# Patient Record
Sex: MALE
[De-identification: names, ages, dates, MRNs, and addresses within clinical notes are randomized per-mention and may not be internally consistent; named-entity substitution may affect disease eponyms.]

---

## 2021-04-06 ENCOUNTER — APPOINTMENT (OUTPATIENT)
Dept: DISASTER EMERGENCY | Facility: OTHER | Age: 36
End: 2021-04-06
Payer: COMMERCIAL

## 2021-04-06 PROCEDURE — 0001A: CPT

## 2021-04-27 ENCOUNTER — APPOINTMENT (OUTPATIENT)
Dept: DISASTER EMERGENCY | Facility: OTHER | Age: 36
End: 2021-04-27
Payer: COMMERCIAL

## 2021-04-27 PROCEDURE — 0002A: CPT

## 2022-08-01 ENCOUNTER — APPOINTMENT (OUTPATIENT)
Dept: PULMONOLOGY | Facility: CLINIC | Age: 37
End: 2022-08-01

## 2022-08-01 VITALS
TEMPERATURE: 95.6 F | DIASTOLIC BLOOD PRESSURE: 80 MMHG | HEIGHT: 67 IN | OXYGEN SATURATION: 98 % | BODY MASS INDEX: 26.68 KG/M2 | HEART RATE: 82 BPM | RESPIRATION RATE: 16 BRPM | WEIGHT: 170 LBS | SYSTOLIC BLOOD PRESSURE: 130 MMHG

## 2022-08-01 DIAGNOSIS — Z78.9 OTHER SPECIFIED HEALTH STATUS: ICD-10-CM

## 2022-08-01 DIAGNOSIS — Z82.49 FAMILY HISTORY OF ISCHEMIC HEART DISEASE AND OTHER DISEASES OF THE CIRCULATORY SYSTEM: ICD-10-CM

## 2022-08-01 DIAGNOSIS — Z82.5 FAMILY HISTORY OF ASTHMA AND OTHER CHRONIC LOWER RESPIRATORY DISEASES: ICD-10-CM

## 2022-08-01 DIAGNOSIS — Z86.69 PERSONAL HISTORY OF OTHER DISEASES OF THE NERVOUS SYSTEM AND SENSE ORGANS: ICD-10-CM

## 2022-08-01 PROBLEM — Z00.00 ENCOUNTER FOR PREVENTIVE HEALTH EXAMINATION: Status: ACTIVE | Noted: 2022-08-01

## 2022-08-01 PROCEDURE — 95012 NITRIC OXIDE EXP GAS DETER: CPT

## 2022-08-01 PROCEDURE — 94727 GAS DIL/WSHOT DETER LNG VOL: CPT

## 2022-08-01 PROCEDURE — 94618 PULMONARY STRESS TESTING: CPT

## 2022-08-01 PROCEDURE — 99204 OFFICE O/P NEW MOD 45 MIN: CPT | Mod: 25

## 2022-08-01 PROCEDURE — 94010 BREATHING CAPACITY TEST: CPT

## 2022-08-01 PROCEDURE — 71046 X-RAY EXAM CHEST 2 VIEWS: CPT

## 2022-08-01 PROCEDURE — 94729 DIFFUSING CAPACITY: CPT

## 2022-08-01 RX ORDER — MONTELUKAST 10 MG/1
10 TABLET, FILM COATED ORAL
Qty: 30 | Refills: 0 | Status: ACTIVE | COMMUNITY
Start: 2021-12-01

## 2022-08-01 RX ORDER — CETIRIZINE HYDROCHLORIDE 10 MG/1
10 TABLET, COATED ORAL
Qty: 30 | Refills: 0 | Status: ACTIVE | COMMUNITY
Start: 2021-12-01

## 2022-08-01 RX ORDER — DESLORATADINE 5 MG/1
5 TABLET ORAL DAILY
Qty: 90 | Refills: 1 | Status: ACTIVE | COMMUNITY
Start: 2022-08-01 | End: 1900-01-01

## 2022-08-01 RX ORDER — ALBUTEROL SULFATE 2.5 MG/3ML
(2.5 MG/3ML) SOLUTION RESPIRATORY (INHALATION)
Qty: 120 | Refills: 5 | Status: ACTIVE | COMMUNITY
Start: 2022-08-01 | End: 1900-01-01

## 2022-08-01 RX ORDER — OLOPATADINE HYDROCHLORIDE 665 UG/1
0.6 SPRAY, METERED NASAL
Qty: 3 | Refills: 1 | Status: ACTIVE | COMMUNITY
Start: 2022-08-01 | End: 1900-01-01

## 2022-08-01 NOTE — ADDENDUM
[FreeTextEntry1] : Documented by Irma Layton acting as a scribe for Dr. Ector Caro on 08/01/2022 \par \par All medical record entries made by the Scribe were at my, Dr. Ector Caro's, direction and personally dictated by me on 08/01/2022 . I have reviewed the chart and agree that the record accurately reflects my personal performance of the history, physical exam, assessment and plan. I have also personally directed, reviewed, and agree with the discharge instructions

## 2022-08-01 NOTE — HISTORY OF PRESENT ILLNESS
[TextBox_4] : Mr. RASMEY is a 36 year old male originally from [place] with a history of migraines, HTN, asthma, allergies/sinus, COVID-19 4/2022 ?CLAUDIA presenting to the office today for initial pulmonary evaluation. His chief complaint is\par \par \par -he notes asthma onset 6-7 years\par -he notes asthma symptoms are chest tightness, chest pressure, wheezing and SOB\par -he notes cats, humidity, illness and cold air exacerbates asthma \par -he notes allergies active spring and fall but mostly during the spring\par -he notes sinus symptoms are nasal congestion and PND\par -he notes not feeling rested after waking up \par -he notes ability to sleep while watching TV\par -he notes memory and concentration are good\par -he notes intermittent nocturia\par -she notes sense of smell and taste is stable \par -He notes bowels are regular \par -he notes no vision issues\par -he notes intermittent migraines\par -he denies use of nebulizer\par \par -he denies any visual issues, nausea, vomiting, fever, chills, sweats, diarrhea, constipation, dysphagia, dizziness, leg swelling, leg pain, itchy eyes, itchy ears, heartburn, reflux, sour taste in the mouth, myalgias or arthralgias.

## 2022-08-01 NOTE — ASSESSMENT
[FreeTextEntry1] : Mr. RAMSEY is a 36 year old male originally from [place] with a history of migraines, HTN, asthma, allergies/sinus, COVID-19 4/2022 ?CLAUDIA presenting to the office today for initial pulmonary evaluation for SOB, moderate persistent asthma, allergies/sinus, ?CLAUDIA\par \par His shortness of breath is multifactorial due to:\par -poor mechanics of breathing \par -out of shape \par - pulmonary disease\par     moderate persistent asthma \par -cardiac disease\par    -doubtful \par \par problem 1: moderate persistent asthma\par - Add Trelegy (200) 1 puff QD \par - Add Singulair 10 mg QHS \par -add Albuterol (0.83) mdi or via nebulizer, Q6H \par -complete alpha 1 anti-trypsin level\par  - Asthma is believed to be caused by inherited (genetic) and environmental factor, but its exact cause is unknown. Asthma may be triggered by allergens, lung infections, or irritants in the air. Asthma triggers are different for each person. \par -Inhaler technique reviewed as well as oral hygiene techniques reviewed with patient. Avoidance of cold air, extremes of temperature, rescue inhaler should be used before exercise. Order of medication reviewed with patient. Recommended use of a cool mist humidifier in the bedroom. \par \par problem 2: allergies/sinus\par -add Clarinex 5 mg QAM \par -add Olopatadine 0.6% 1 sniff BID \par - script given for blood work: asthma profile, food IgE panel, eosinophil level, IgE level, Vitamin D level,  \par - Environmental measures for allergies were encouraged including mattress and pillow cover, air purifier, and environmental controls. \par \par Problem 3:?CLAUDIA\par -complete Home Sleep study \par Sleep apnea is associated with adverse clinical consequences which can affect most organ systems.  Cardiovascular disease risk includes arrhythmias, atrial fibrillation, hypertension, coronary artery disease, and stroke. Metabolic disorders include diabetes type 2, non-alcoholic fatty liver disease. Mood disorder especially depression; and cognitive decline especially in the elderly. Associations with  chronic reflux/Davidson’s esophagus some but not all inclusive. \par -Reasons  include arousal consistent with hypopnea; respiratory events most prominent in REM sleep or supine position; therefore sleep staging and body position are important for accurate diagnosis and estimation of AHI. \par \par problem 4: cardiac disease\par -recommended to continue to follow up with Cardiologist if needed \par \par problem 5: poor breathing mechanics\par -Proper breathing techniques were reviewed with an emphasis of exhalation. Patient instructed to breath in for 1 second and out for four seconds. Patient was encouraged to not talk while walking. \par \par problem 6:  out of shape \par -Weight loss, exercise, and diet control were discussed and are highly encouraged. Treatment options are given such as, aqua therapy, and contacting a nutritionist. Recommended to use the elliptical, stationary bike, less use of treadmill. \par \par problem 7: health maintenance \par -recommended yearly flu shot after October 15\par -recommended strep pneumonia vaccines: Prevnar-13 vaccine, followed by Pneumo vaccine 23 one year following after 65 years old. \par -recommended early intervention for Upper Respiratory Infections (URIs)\par -recommended regular osteoporosis evaluations\par -recommended early dermatological evaluations\par -recommended after the age of 50 to the age of 70, colonoscopy every 5 years\par \par F/U in 6-8 weeks.\par He is encouraged to call with any changes, concerns, or questions\par

## 2022-08-01 NOTE — REASON FOR VISIT
[Initial] : an initial visit [TextBox_44] : SOB, moderate persistent asthma, allergies/ sinus, ?CLAUDIA

## 2022-08-01 NOTE — PROCEDURE
[FreeTextEntry1] : CXR reveals normal sized heart; there was no evidence of infiltrate or effusion -- A normal chest radiograph. \par \par FENO was 54; normal value being less than 25\par Fractional exhaled nitric oxide (FENO) is regarded as a simple, noninvasive method for assessing eosinophilic airway inflammation. Produced by a variety of cells within the lung, nitric oxide (NO) concentrations are generally low in healthy individuals. However, high concentrations of NO appear to be involved in nonspecific host defense mechanisms and chronic inflammatory diseases such as asthma. The American Thoracic Society (ATS) therefore has recommended using FENO to aid in the diagnosis and monitoring of eosinophilic airway inflammation and asthma, and for identifying steroid responsive individuals whose chronic respiratory symptoms may be airway inflammation. \par \par FULL PFTs reveals mild restrictive and severe obstructive dysfunction; FEV1 was  1.91L which is 51% of predicted; below normal lung volumes; normal diffusion of 23.1, which is 85% of predicted; normal flow volume loop \par \par 6 minute walk test reveals a low saturation of 88% with slight dyspnea or fatigue; walked 391.2 meters

## 2022-08-01 NOTE — PHYSICAL EXAM
[No Acute Distress] : no acute distress [Normal Oropharynx] : normal oropharynx [Normal Appearance] : normal appearance [No Neck Mass] : no neck mass [Normal Rate/Rhythm] : normal rate/rhythm [Normal S1, S2] : normal s1, s2 [No Murmurs] : no murmurs [No Resp Distress] : no resp distress [Clear to Auscultation Bilaterally] : clear to auscultation bilaterally [No Abnormalities] : no abnormalities [Benign] : benign [Normal Gait] : normal gait [No Clubbing] : no clubbing [No Cyanosis] : no cyanosis [No Edema] : no edema [FROM] : FROM [Normal Color/ Pigmentation] : normal color/ pigmentation [No Focal Deficits] : no focal deficits [Oriented x3] : oriented x3 [Normal Affect] : normal affect [III] : Mallampati Class: III [TextBox_68] : I:E 1:3, expiratory wheezes b/l

## 2022-08-03 ENCOUNTER — LABORATORY RESULT (OUTPATIENT)
Age: 37
End: 2022-08-03

## 2022-08-03 LAB
25(OH)D3 SERPL-MCNC: 38.6 NG/ML
A1AT SERPL-MCNC: 117 MG/DL
BASOPHILS # BLD AUTO: 0.02 K/UL
BASOPHILS NFR BLD AUTO: 0.3 %
EOSINOPHIL # BLD AUTO: 0.41 K/UL
EOSINOPHIL NFR BLD AUTO: 6.2 %
HCT VFR BLD CALC: 41.6 %
HGB BLD-MCNC: 13.1 G/DL
IMM GRANULOCYTES NFR BLD AUTO: 0.2 %
LYMPHOCYTES # BLD AUTO: 2.12 K/UL
LYMPHOCYTES NFR BLD AUTO: 32.3 %
MAN DIFF?: NORMAL
MCHC RBC-ENTMCNC: 25.6 PG
MCHC RBC-ENTMCNC: 31.5 GM/DL
MCV RBC AUTO: 81.4 FL
MONOCYTES # BLD AUTO: 0.44 K/UL
MONOCYTES NFR BLD AUTO: 6.7 %
NEUTROPHILS # BLD AUTO: 3.57 K/UL
NEUTROPHILS NFR BLD AUTO: 54.3 %
PLATELET # BLD AUTO: 248 K/UL
RBC # BLD: 5.11 M/UL
RBC # FLD: 15.9 %
WBC # FLD AUTO: 6.57 K/UL

## 2022-08-04 LAB — 24R-OH-CALCIDIOL SERPL-MCNC: 64.3 PG/ML

## 2022-08-06 LAB
A ALTERNATA IGE QN: 0.11 KUA/L
A ALTERNATA IGE QN: 0.11 KUA/L
A FUMIGATUS IGE QN: <0.1 KUA/L
A FUMIGATUS IGE QN: <0.1 KUA/L
BERMUDA GRASS IGE QN: 1.32 KUA/L
BOXELDER IGE QN: 1.66 KUA/L
C ALBICANS IGE QN: 1.71 KUA/L
C HERBARUM IGE QN: <0.1 KUA/L
C HERBARUM IGE QN: <0.1 KUA/L
CALIF WALNUT IGE QN: 1.61 KUA/L
CAT DANDER IGE QN: 0.23 KUA/L
CAT DANDER IGE QN: 0.23 KUA/L
CLAM IGE QN: 0.86 KUA/L
CMN PIGWEED IGE QN: 1.89 KUA/L
CODFISH IGE QN: <0.1 KUA/L
COMMON RAGWEED IGE QN: 2.09 KUA/L
COMMON RAGWEED IGE QN: 2.09 KUA/L
CORN IGE QN: 1.2 KUA/L
COTTONWOOD IGE QN: 1.59 KUA/L
COW MILK IGE QN: 0.29 KUA/L
D FARINAE IGE QN: >100 KUA/L
D FARINAE IGE QN: >100 KUA/L
D PTERONYSS IGE QN: >100 KUA/L
D PTERONYSS IGE QN: >100 KUA/L
DEPRECATED A ALTERNATA IGE RAST QL: NORMAL
DEPRECATED A ALTERNATA IGE RAST QL: NORMAL
DEPRECATED A FUMIGATUS IGE RAST QL: 0
DEPRECATED A FUMIGATUS IGE RAST QL: 0
DEPRECATED BERMUDA GRASS IGE RAST QL: 2
DEPRECATED BOXELDER IGE RAST QL: 2
DEPRECATED C ALBICANS IGE RAST QL: 2
DEPRECATED C HERBARUM IGE RAST QL: 0
DEPRECATED C HERBARUM IGE RAST QL: 0
DEPRECATED CAT DANDER IGE RAST QL: NORMAL
DEPRECATED CAT DANDER IGE RAST QL: NORMAL
DEPRECATED CLAM IGE RAST QL: 2
DEPRECATED CODFISH IGE RAST QL: 0
DEPRECATED COMMON PIGWEED IGE RAST QL: 2
DEPRECATED COMMON RAGWEED IGE RAST QL: 2
DEPRECATED COMMON RAGWEED IGE RAST QL: 2
DEPRECATED CORN IGE RAST QL: 2
DEPRECATED COTTONWOOD IGE RAST QL: 2
DEPRECATED COW MILK IGE RAST QL: NORMAL
DEPRECATED D FARINAE IGE RAST QL: 6
DEPRECATED D FARINAE IGE RAST QL: 6
DEPRECATED D PTERONYSS IGE RAST QL: 6
DEPRECATED D PTERONYSS IGE RAST QL: 6
DEPRECATED DOG DANDER IGE RAST QL: 2
DEPRECATED DOG DANDER IGE RAST QL: 2
DEPRECATED DUCK FEATHER IGE RAST QL: 0
DEPRECATED EGG WHITE IGE RAST QL: NORMAL
DEPRECATED GOOSE FEATHER IGE RAST QL: 0
DEPRECATED GOOSEFOOT IGE RAST QL: 2
DEPRECATED LONDON PLANE IGE RAST QL: 2
DEPRECATED M RACEMOSUS IGE RAST QL: 2
DEPRECATED MOUSE URINE PROT IGE RAST QL: 0
DEPRECATED MUGWORT IGE RAST QL: 2
DEPRECATED P NOTATUM IGE RAST QL: 0
DEPRECATED PEANUT IGE RAST QL: 2
DEPRECATED RED CEDAR IGE RAST QL: 2
DEPRECATED ROACH IGE RAST QL: 3
DEPRECATED ROACH IGE RAST QL: 3
DEPRECATED SCALLOP IGE RAST QL: 1.77 KUA/L
DEPRECATED SESAME SEED IGE RAST QL: 2
DEPRECATED SHEEP SORREL IGE RAST QL: 2
DEPRECATED SHRIMP IGE RAST QL: 2
DEPRECATED SILVER BIRCH IGE RAST QL: 2
DEPRECATED SOYBEAN IGE RAST QL: 2
DEPRECATED TIMOTHY IGE RAST QL: 2
DEPRECATED TIMOTHY IGE RAST QL: 2
DEPRECATED WALNUT IGE RAST QL: 2
DEPRECATED WHEAT IGE RAST QL: 2
DEPRECATED WHITE ASH IGE RAST QL: 2
DEPRECATED WHITE OAK IGE RAST QL: 2
DEPRECATED WHITE OAK IGE RAST QL: 2
DOG DANDER IGE QN: 0.91 KUA/L
DOG DANDER IGE QN: 0.91 KUA/L
DUCK FEATHER IGE QN: <0.1 KUA/L
EGG WHITE IGE QN: 0.32 KUA/L
GOOSE FEATHER IGE QN: <0.1 KUA/L
GOOSEFOOT IGE QN: 1.65 KUA/L
LONDON PLANE IGE QN: 1.62 KUA/L
M RACEMOSUS IGE QN: 1.31 KUA/L
MOUSE URINE PROT IGE QN: <0.1 KUA/L
MUGWORT IGE QN: 1.25 KUA/L
MULBERRY (T70) CLASS: 2
MULBERRY (T70) CONC: 1.27 KUA/L
P NOTATUM IGE QN: <0.1 KUA/L
PEANUT IGE QN: 1.73 KUA/L
RED CEDAR IGE QN: 1.53 KUA/L
ROACH IGE QN: 6 KUA/L
ROACH IGE QN: 6 KUA/L
SCALLOP IGE QN: 1.76 KUA/L
SCALLOP IGE QN: 2
SESAME SEED IGE QN: 1.79 KUA/L
SHEEP SORREL IGE QN: 1.63 KUA/L
SILVER BIRCH IGE QN: 1.52 KUA/L
SOYBEAN IGE QN: 1.1 KUA/L
TIMOTHY IGE QN: 1.98 KUA/L
TIMOTHY IGE QN: 1.98 KUA/L
TOTAL IGE SMQN RAST: 1082 KU/L
TREE ALLERG MIX1 IGE QL: 2
WALNUT IGE QN: 0.82 KUA/L
WHEAT IGE QN: 1.58 KUA/L
WHITE ASH IGE QN: 1.82 KUA/L
WHITE ELM IGE QN: 1.97 KUA/L
WHITE ELM IGE QN: 2
WHITE OAK IGE QN: 1.58 KUA/L
WHITE OAK IGE QN: 1.58 KUA/L

## 2022-08-08 LAB
A1AT PHENOTYP SERPL-IMP: NORMAL
A1AT SERPL-MCNC: 111 MG/DL

## 2022-08-10 ENCOUNTER — NON-APPOINTMENT (OUTPATIENT)
Age: 37
End: 2022-08-10

## 2022-08-12 LAB
ANNOTATION COMMENT IMP: NORMAL
ELECTRONIC SIGNATURE: NORMAL
SERPINA1 GENE MUT TESTED BLD/T: NORMAL

## 2022-08-16 ENCOUNTER — NON-APPOINTMENT (OUTPATIENT)
Age: 37
End: 2022-08-16

## 2022-10-12 ENCOUNTER — APPOINTMENT (OUTPATIENT)
Dept: PULMONOLOGY | Facility: CLINIC | Age: 37
End: 2022-10-12

## 2022-10-12 ENCOUNTER — NON-APPOINTMENT (OUTPATIENT)
Age: 37
End: 2022-10-12

## 2022-10-12 VITALS
HEART RATE: 73 BPM | WEIGHT: 173 LBS | OXYGEN SATURATION: 98 % | DIASTOLIC BLOOD PRESSURE: 82 MMHG | TEMPERATURE: 98.4 F | SYSTOLIC BLOOD PRESSURE: 140 MMHG | HEIGHT: 67 IN | BODY MASS INDEX: 27.15 KG/M2 | RESPIRATION RATE: 16 BRPM

## 2022-10-12 DIAGNOSIS — J30.9 ALLERGIC RHINITIS, UNSPECIFIED: ICD-10-CM

## 2022-10-12 DIAGNOSIS — U07.1 COVID-19: ICD-10-CM

## 2022-10-12 DIAGNOSIS — R76.8 OTHER SPECIFIED ABNORMAL IMMUNOLOGICAL FINDINGS IN SERUM: ICD-10-CM

## 2022-10-12 DIAGNOSIS — J82.83 EOSINOPHILIC ASTHMA: ICD-10-CM

## 2022-10-12 DIAGNOSIS — R06.02 SHORTNESS OF BREATH: ICD-10-CM

## 2022-10-12 PROCEDURE — 94010 BREATHING CAPACITY TEST: CPT

## 2022-10-12 PROCEDURE — 99214 OFFICE O/P EST MOD 30 MIN: CPT | Mod: 25

## 2022-10-12 PROCEDURE — 95012 NITRIC OXIDE EXP GAS DETER: CPT

## 2022-10-12 RX ORDER — ALBUTEROL SULFATE 90 UG/1
108 (90 BASE) INHALANT RESPIRATORY (INHALATION)
Qty: 3 | Refills: 1 | Status: ACTIVE | COMMUNITY
Start: 2021-12-01

## 2022-10-12 NOTE — PROCEDURE
[FreeTextEntry1] : FENO was 32; a normal value being less than 25\par Fractional exhaled nitric oxide (FENO) is regarded as a simple, noninvasive method for assessing eosinophilic airway inflammation. Produced by a variety of cells within the lung, nitric oxide (NO) concentrations are generally low in healthy individuals. However, high concentrations of NO appear to be involved in nonspecific host defense mechanisms and chronic inflammatory diseases such as asthma. The American Thoracic Society (ATS) therefore has recommended using FENO to aid in the diagnosis and monitoring of eosinophilic airway inflammation and asthma, and for identifying steroid responsive individuals whose chronic respiratory symptoms may be caused by airway inflammation. \par \par PFT reveals very mild obstruction, mid-low lung volumes, with an FEV1 of  2.76L, which is 84% of predicted, with a normal flow volume loop

## 2022-10-12 NOTE — ASSESSMENT
[FreeTextEntry1] : Mr. RAMSEY is a 37 year old male originally from [place] with a history of migraines, HTN, asthma, allergies/sinus, COVID-19 4/2022 ?CLAUDIA presenting to the office today for follow up pulmonary evaluation for SOB, moderate persistent asthma, allergies/sinus, ?CLAUDIA, elevated IgE/ Eosinophilic Asthma \par \par His shortness of breath is multifactorial due to:\par -poor mechanics of breathing \par -out of shape \par - pulmonary disease\par     moderate persistent asthma \par -cardiac disease\par    -doubtful \par \par problem 1: moderate persistent asthma\par - Trelegy (200) 1 puff QD \par - Singulair 10 mg QHS \par -add Albuterol (0.83) mdi or via nebulizer, Q6H \par -s/p alpha 1 anti-trypsin level- WNL \par  - Asthma is believed to be caused by inherited (genetic) and environmental factor, but its exact cause is unknown. Asthma may be triggered by allergens, lung infections, or irritants in the air. Asthma triggers are different for each person. \par -Inhaler technique reviewed as well as oral hygiene techniques reviewed with patient. Avoidance of cold air, extremes of temperature, rescue inhaler should be used before exercise. Order of medication reviewed with patient. Recommended use of a cool mist humidifier in the bedroom. \par \par problem 1A: Elevated IgE (over 1000) \par - Xolair Candidate \par -Xolair is a recombinant DNA- derived humanized IgG1K monoclonal antibody that selectively binds ot human immunoglobulin E (IgE). Xolair is produced by a Chinese hamster ovary cell suspension culture in nutrient medium containing the antibiotic gentamicin. Gentamicin is not detectable in the final product. Xolair is a sterile, white, preservative free, lyophilized powder contained in a single use vial that is reconstituted with sterile water for suspension. Side effects include: wheezing, tightness of the chest, trouble breathing, hives, skin rash, feeling anxious or light-headed, fainting, warmth or tingling under skin, or swelling of face, lips, or tongue \par \par problem 1B: Eosinophilic Asthma \par - Biological Candidate \par - The safety and efficacy of Nucala was established in three double-blind, randomized, placebo-controlled trials in patients with severe asthma. Compared to a placebo, patients with severe asthma receiving Nucala had fewer exacerbation requiring hospitalization and/or emergency department visits, and a longer time to first exacerbation. In addition, patients with severe asthma receiving Nucala or Fasenra experienced greater reductions in their daily maintenance oral corticosteroid dose, while maintaining asthma control compared with patients receiving placebo. Treatment with Nucala did not result in a significant improvement in lung function, as measured by the volume of air exhaled by patients in one second. The most common side effects include: headache, injection site reactions, back pain, weakness, and fatigue; hypersensitivity reactions can occur within hours or days including swelling of the face, mouth, and tongue, fainting, dizziness, hives, breathing problems, and rash; herpes zoster infections have occurred. The drug is a monoclonal antibody that inhibits interleukin-5 which helps regular eosinophils, a type of white blood cell that contributes to asthma. The over-production of eosinophils can cause inflammation in the lungs, increasing the frequency of asthma attacks. Patients must also take other medications, including high dose inhaled corticosteroids and at least one additional asthma drug. \par \par problem 2: allergies/sinus\par -add Clarinex 5 mg QAM \par -add Olopatadine 0.6% 1 sniff BID \par - script given for blood work:+ asthma profile, food IgE panel +, + eosinophil level, + IgE level, Vitamin D level WNL,  \par - Environmental measures for allergies were encouraged including mattress and pillow cover, air purifier, and environmental controls. \par \par Problem 3:?CLAUDIA ([place])\par -complete Home Sleep study \par Sleep apnea is associated with adverse clinical consequences which can affect most organ systems.  Cardiovascular disease risk includes arrhythmias, atrial fibrillation, hypertension, coronary artery disease, and stroke. Metabolic disorders include diabetes type 2, non-alcoholic fatty liver disease. Mood disorder especially depression; and cognitive decline especially in the elderly. Associations with  chronic reflux/Davidson’s esophagus some but not all inclusive. \par -Reasons  include arousal consistent with hypopnea; respiratory events most prominent in REM sleep or supine position; therefore sleep staging and body position are important for accurate diagnosis and estimation of AHI. \par \par problem 4: cardiac disease\par -recommended to continue to follow up with Cardiologist if needed \par \par problem 5: poor breathing mechanics\par -Proper breathing techniques were reviewed with an emphasis of exhalation. Patient instructed to breath in for 1 second and out for four seconds. Patient was encouraged to not talk while walking. \par \par problem 6:  out of shape \par -Weight loss, exercise, and diet control were discussed and are highly encouraged. Treatment options are given such as, aqua therapy, and contacting a nutritionist. Recommended to use the elliptical, stationary bike, less use of treadmill. \par \par problem 7: health maintenance \par -recommended yearly flu shot after October 15\par -recommended strep pneumonia vaccines: Prevnar-13 vaccine, followed by Pneumo vaccine 23 one year following after 65 years old. \par -recommended early intervention for Upper Respiratory Infections (URIs)\par -recommended regular osteoporosis evaluations\par -recommended early dermatological evaluations\par -recommended after the age of 50 to the age of 70, colonoscopy every 5 years\par \par F/U in 6-8 weeks.\par He is encouraged to call with any changes, concerns, or questions\par

## 2022-10-12 NOTE — REASON FOR VISIT
[Follow-Up] : a follow-up visit [TextBox_44] : SOB, moderate persistent asthma, allergies/ sinus, ?CLAUDIA

## 2022-10-12 NOTE — ADDENDUM
[FreeTextEntry1] : Documented by Jose Alejandro Higuera acting as a scribe for Dr. Ector Caro on (10/12/2022).\par \par All medical record entries made by the Scribe were at my, Dr. Ector Caro's, direction and personally dictated by me on (10/12/2022). I have reviewed the chart and agree that the record accurately reflects my personal performance of the history, physical exam, assessment and plan. I have personally directed, reviewed, and agree with the discharge instructions.

## 2022-10-12 NOTE — HISTORY OF PRESENT ILLNESS
[TextBox_4] : Mr. RAMSEY is a 37 year old male originally from [place] with a history of migraines, HTN, asthma, allergies/sinus, COVID-19 4/2022 ?CLAUDIA presenting to the office today for follow-up pulmonary evaluation. His chief complaint is\par - he notes feeling well in general\par - he notes when he runs, he feels chest tightness \par - he notes feeling tired sometimes \par - he notes he will be changing work \par - he notes weight is stable \par - he denies any visual issues\par - he notes appetite is okay\par - he notes eating well \par \par \par - He denies any headaches, nausea, vomiting, fever, chills, sweats, chest pain, chest pressure, palpitations, coughing, wheezing, diarrhea, constipation, dysphagia, myalgias, dizziness, leg swelling, leg pain, itchy eyes, itchy ears, heartburn, reflux, or sour taste in the mouth

## 2022-10-17 ENCOUNTER — APPOINTMENT (OUTPATIENT)
Dept: SLEEP CENTER | Facility: CLINIC | Age: 37
End: 2022-10-17

## 2023-01-19 ENCOUNTER — APPOINTMENT (OUTPATIENT)
Dept: PULMONOLOGY | Facility: CLINIC | Age: 38
End: 2023-01-19
Payer: COMMERCIAL

## 2023-01-19 VITALS
HEART RATE: 68 BPM | RESPIRATION RATE: 16 BRPM | SYSTOLIC BLOOD PRESSURE: 130 MMHG | DIASTOLIC BLOOD PRESSURE: 90 MMHG | HEIGHT: 67 IN | TEMPERATURE: 97.5 F | OXYGEN SATURATION: 98 % | BODY MASS INDEX: 27 KG/M2 | WEIGHT: 172 LBS

## 2023-01-19 DIAGNOSIS — R06.83 SNORING: ICD-10-CM

## 2023-01-19 DIAGNOSIS — J45.40 MODERATE PERSISTENT ASTHMA, UNCOMPLICATED: ICD-10-CM

## 2023-01-19 PROCEDURE — 99213 OFFICE O/P EST LOW 20 MIN: CPT

## 2023-01-19 RX ORDER — ALBUTEROL SULFATE 90 UG/1
108 (90 BASE) INHALANT RESPIRATORY (INHALATION)
Qty: 1 | Refills: 1 | Status: ACTIVE | COMMUNITY
Start: 2023-01-19 | End: 1900-01-01

## 2023-01-19 NOTE — HISTORY OF PRESENT ILLNESS
[TextBox_4] : Mr. RAMSEY is a 37 year old male originally from [place] with a history of migraines, HTN,  moderate persistent asthma/eosinophilic asthma, allergies/sinus, COVID-19 4/2022, & snoring presenting to the office today for follow up pulmonary evaluation. He is accompanied by his wife. \par \par Patient states he feels he is in his baseline state of health.\par Patient notes he is compliant on Trelegy and montelukast.\par Patient's wife notes patient snores and she has witnessed apneas.\par Patient states he sleeps from 8 to 10 hours per night and does feel well rested upon awakening.\par \par Patient denies any pulmonary concerns at this time.

## 2023-01-19 NOTE — REASON FOR VISIT
[Follow-Up] : a follow-up visit [Sleep Evaluation] : sleep evaluation [Asthma] : asthma [Spouse] : spouse

## 2023-01-19 NOTE — ASSESSMENT
[FreeTextEntry1] : Mr. RAMSEY is a 37 year old male originally from [place] with a history of migraines, HTN,  moderate persistent asthma/eosinophilic asthma, allergies/sinus, COVID-19 4/2022, & snoring presenting to the office today for follow up pulmonary evaluation. He is accompanied by his wife. \par \par 1. Asthma:\par - Add albuterol HFA 2 puffs every 6 hours as needed..\par - Continue Trelegy 200 mcg 1 inhale daily.  Rinse and gargle after use.\par - Continue montelukast 10 mg p.o. nightly.\par \par 2. Snoring:\par - RX for HST to evaluate CLAUDIA.\par \par Patient to follow up with Dr. Caro as scheduled for PFTs and visit. \par Patient to call with further questions and concerns.\par Patient verbalizes understanding of care plan and is agreeable.\par

## 2023-04-08 ENCOUNTER — RX RENEWAL (OUTPATIENT)
Age: 38
End: 2023-04-08

## 2023-04-08 RX ORDER — MONTELUKAST 10 MG/1
10 TABLET, FILM COATED ORAL
Qty: 90 | Refills: 0 | Status: ACTIVE | COMMUNITY
Start: 2022-08-01 | End: 1900-01-01

## 2023-05-25 RX ORDER — FLUTICASONE FUROATE, UMECLIDINIUM BROMIDE AND VILANTEROL TRIFENATATE 200; 62.5; 25 UG/1; UG/1; UG/1
200-62.5-25 POWDER RESPIRATORY (INHALATION)
Qty: 3 | Refills: 1 | Status: ACTIVE | COMMUNITY
Start: 2022-08-01 | End: 1900-01-01

## 2023-07-27 ENCOUNTER — APPOINTMENT (OUTPATIENT)
Dept: PULMONOLOGY | Facility: CLINIC | Age: 38
End: 2023-07-27